# Patient Record
Sex: FEMALE | Race: OTHER | Employment: PART TIME | URBAN - METROPOLITAN AREA
[De-identification: names, ages, dates, MRNs, and addresses within clinical notes are randomized per-mention and may not be internally consistent; named-entity substitution may affect disease eponyms.]

---
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
## 2023-05-02 ENCOUNTER — OFFICE VISIT (OUTPATIENT)
Dept: INTERNAL MEDICINE | Age: 23
End: 2023-05-02
Payer: MEDICAID

## 2023-05-02 VITALS
BODY MASS INDEX: 36.14 KG/M2 | HEIGHT: 62 IN | SYSTOLIC BLOOD PRESSURE: 123 MMHG | WEIGHT: 196.4 LBS | TEMPERATURE: 97.5 F | DIASTOLIC BLOOD PRESSURE: 71 MMHG | OXYGEN SATURATION: 98 % | HEART RATE: 77 BPM

## 2023-05-02 DIAGNOSIS — Z11.59 NEED FOR HEPATITIS C SCREENING TEST: ICD-10-CM

## 2023-05-02 DIAGNOSIS — N64.52 NIPPLE DISCHARGE: Primary | ICD-10-CM

## 2023-05-02 DIAGNOSIS — Z11.3 ROUTINE SCREENING FOR STI (SEXUALLY TRANSMITTED INFECTION): ICD-10-CM

## 2023-05-02 PROBLEM — Z86.69 HX OF MIGRAINES: Status: RESOLVED | Noted: 2018-06-13 | Resolved: 2023-05-02

## 2023-05-02 PROBLEM — O09.90 HIGH-RISK PREGNANCY: Status: RESOLVED | Noted: 2018-06-13 | Resolved: 2023-05-02

## 2023-05-02 PROBLEM — Z3A.40 40 WEEKS GESTATION OF PREGNANCY: Status: RESOLVED | Noted: 2019-05-02 | Resolved: 2023-05-02

## 2023-05-02 PROBLEM — O09.893 SHORT INTERVAL BETWEEN PREGNANCIES COMPLICATING PREGNANCY, ANTEPARTUM, THIRD TRIMESTER: Status: RESOLVED | Noted: 2019-05-02 | Resolved: 2023-05-02

## 2023-05-02 PROCEDURE — 99203 OFFICE O/P NEW LOW 30 MIN: CPT | Performed by: STUDENT IN AN ORGANIZED HEALTH CARE EDUCATION/TRAINING PROGRAM

## 2023-05-02 SDOH — ECONOMIC STABILITY: FOOD INSECURITY: WITHIN THE PAST 12 MONTHS, YOU WORRIED THAT YOUR FOOD WOULD RUN OUT BEFORE YOU GOT MONEY TO BUY MORE.: NEVER TRUE

## 2023-05-02 SDOH — ECONOMIC STABILITY: INCOME INSECURITY: HOW HARD IS IT FOR YOU TO PAY FOR THE VERY BASICS LIKE FOOD, HOUSING, MEDICAL CARE, AND HEATING?: NOT HARD AT ALL

## 2023-05-02 SDOH — ECONOMIC STABILITY: HOUSING INSECURITY
IN THE LAST 12 MONTHS, WAS THERE A TIME WHEN YOU DID NOT HAVE A STEADY PLACE TO SLEEP OR SLEPT IN A SHELTER (INCLUDING NOW)?: NO

## 2023-05-02 SDOH — ECONOMIC STABILITY: FOOD INSECURITY: WITHIN THE PAST 12 MONTHS, THE FOOD YOU BOUGHT JUST DIDN'T LAST AND YOU DIDN'T HAVE MONEY TO GET MORE.: NEVER TRUE

## 2023-05-02 ASSESSMENT — PATIENT HEALTH QUESTIONNAIRE - PHQ9
SUM OF ALL RESPONSES TO PHQ QUESTIONS 1-9: 0
2. FEELING DOWN, DEPRESSED OR HOPELESS: 0
SUM OF ALL RESPONSES TO PHQ QUESTIONS 1-9: 0
SUM OF ALL RESPONSES TO PHQ9 QUESTIONS 1 & 2: 0
SUM OF ALL RESPONSES TO PHQ QUESTIONS 1-9: 0
1. LITTLE INTEREST OR PLEASURE IN DOING THINGS: 0
SUM OF ALL RESPONSES TO PHQ QUESTIONS 1-9: 0

## 2023-05-02 NOTE — PROGRESS NOTES
[facility]    New Patient Note/History and Physical    Date of patient's visit: 5/2/2023    Name: Jose Peck      YOB: 2000    Patient Care Team:  Jaydon Zabala MD as PCP - General (Internal Medicine)    REASON FOR VISIT: First Visit, establish care     Chief Complaint   Patient presents with    Nipple Problem     Pt having nipple discharge comes out green on rt breast and has white discharge on left    New Patient     To est care and have check up       HISTORY OF PRESENTING ILLNESS:    History was obtained from the patient. Jose Peck is a 21 y.o. is here to establish care. She has no substantial PMH and does not take any medications. Current concerns include   - Nipple discharge b/l but L>R. Left nipple discharge is milky, right nipple discharge has been green lately. No bloody discharge. States it is small amount and only occurs with expression/pressure. Last pregnancy was in 2019. She stopped breast feeding 2020 but felt she never fully \"dried up\". Amount/frequency has slowly lessened but still present. Is not on any medications. No appreciable masses of the breast, axillary lymphadenopathy, rash/skin changes of the breast. No fever, chills. Possible FHx of breast cancer in her paternal aunt, cousin but unsure exactly. Has h/o migraines but no recent worsening, no vision changes. In terms of HM, pt state she had Pap in her 25s with Ob/Gyn Dr. Tosha Guy, unsure when. States it was normal but records not available. Seldom sexually active. Has 1 partner. No hx of STI. Agreeable to C/G screening, although asymptomatic. Not on contraceptives. Was on Nexplanon but was removed years ago per patient. 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 No tobacco, drug or EtOH use. Otherwise reports feeling well.

## 2023-05-09 ENCOUNTER — TELEPHONE (OUTPATIENT)
Dept: INTERNAL MEDICINE | Age: 23
End: 2023-05-09

## 2023-05-09 DIAGNOSIS — N64.52 NIPPLE DISCHARGE: Primary | ICD-10-CM

## 2023-05-09 NOTE — TELEPHONE ENCOUNTER
[facility] since patient is having symptoms she also needs diagnostic mammogram.

## 2025-03-22 ENCOUNTER — HOSPITAL ENCOUNTER (EMERGENCY)
Age: 25
Discharge: HOME | End: 2025-03-22
Attending: EMERGENCY MEDICINE
Payer: MEDICAID

## 2025-03-22 VITALS
OXYGEN SATURATION: 99 % | TEMPERATURE: 98.6 F | SYSTOLIC BLOOD PRESSURE: 120 MMHG | DIASTOLIC BLOOD PRESSURE: 71 MMHG | HEART RATE: 78 BPM | RESPIRATION RATE: 19 BRPM

## 2025-03-22 DIAGNOSIS — Z32.01 PREGNANCY TEST POSITIVE: Primary | ICD-10-CM

## 2025-03-22 LAB — HCG SERPL QL: POSITIVE

## 2025-03-22 PROCEDURE — 99283 EMERGENCY DEPT VISIT LOW MDM: CPT

## 2025-03-22 PROCEDURE — 84703 CHORIONIC GONADOTROPIN ASSAY: CPT

## 2025-03-22 ASSESSMENT — ENCOUNTER SYMPTOMS
RESPIRATORY NEGATIVE: 1
GASTROINTESTINAL NEGATIVE: 1

## 2025-03-22 ASSESSMENT — PAIN - FUNCTIONAL ASSESSMENT: PAIN_FUNCTIONAL_ASSESSMENT: NONE - DENIES PAIN

## 2025-03-22 NOTE — ED NOTES
Pt presented to ED ambulatory from triage.  Pt presents with wanting a pregnancy test.  Pt states she has no other C/O.  Pt denies any pain.  Pt is Alert and oriented. Pt is resting comfortably on stretcher with call light in reach.  No acute distress noted. Respirations are even and unlabored.  White board updated. Will continue to follow plan of care.

## 2025-03-22 NOTE — ED PROVIDER NOTES
[facility]     Emergency Department     Faculty Note/ Attestation      Pt Name: Valerie Brink                                       MRN: [id number]  Birthdate 2000  Date of evaluation: 3/22/2025  Note Started: 6:52 PM EDT    Patients PCP:    Esther Ruano MD    Attestation  I performed a history and physical examination of the patient and discussed management with the resident. I reviewed the resident’s note and agree with the documented findings and plan of care. Any areas of disagreement are noted on the chart. I was personally present for the key portions of any procedures. I have documented in the chart those procedures where I was not present during the key portions. I have reviewed the emergency nurses triage note. I agree with the chief complaint, past medical history, past surgical history, allergies, medications, social and family history as documented unless otherwise noted below.    For Physician Assistant/ Nurse Practitioner cases/documentation I have personally evaluated this patient and have completed at least one if not all key elements of the E/M (history, physical exam, and MDM). Additional findings are as noted.    Initial Screens:        Dav Coma Scale  Eye Opening: Spontaneous  Best Verbal Response: Oriented  Best Motor Response: Obeys commands  [place] Coma Scale Score: 15    Vitals:    Vitals:    03/22/25 1853   BP: 120/71   Pulse: 78   Resp: 19   Temp: 98.6 °F (37 °C)   TempSrc: Oral   SpO2: 99%       CHIEF COMPLAINT       Chief Complaint   Patient presents with   • Pregnancy Test     Patient is a 24-year-old female here for pregnancy test no other symptoms no nausea vomiting vaginal bleeding or discharge      EMERGENCY DEPARTMENT COURSE:     -------------------------  BP: 120/71, Temp: 98.6 °F (37 °C), Pulse: 78, Respirations: 19  Physical Exam __  Constitutional:  oriented to person, place, and time.  appears well-developed and well-nourished.   HENT: no  facial swelling or edema  Head: Normocephalic and atraumatic.   Eyes: Right eye exhibits no discharge. Left eye exhibits no discharge. No scleral icterus.   Neck: No JVD present. No tracheal deviation present.   Cardiovascular: pulses present in extremities  Pulmonary/Chest: Effort normal. No respiratory distress.   Musculoskeletal: Normal range of motion. exhibits no edema.   Neurological: they are alert and oriented to person, place, and time.  Skin: Skin is warm and dry.   Psychiatric: has a normal mood and affect.  behavior is normal.      Comments  Medical Decision Making  Amount and/or Complexity of Data Reviewed  Labs: ordered.      Will assess pregnancy test without symptoms patient to follow-up as an outpatient with OB for the amenorrhea based on the results of the pregnancy test    ED Course as of 03/22/25 1956   Sat Mar 22, 2025   1938 Preg, Serum(!): POSITIVE [[place]]   1943 Patient was updated regarding left finding.  Return precaution were given and explained including worsening of symptoms or developing new symptoms like vaginal bleeding, discharge, pain, abdominal pain, nausea, vomiting, fever.  Patient was advised to follow-up with an OB/GYN doctor for a repeat beta hCG along with a transvaginal ultrasound to confirm the location of pregnancy.  A list has been provided to the patient for the OB clinic.  Patient vocalized understanding and agrees with the plan.  Patient was advised to take prenatal vitamins as well in addition to following up with primary care physician.  Patient was given opportunity to ask question all question answered accordingly.  Stable for discharge [[place]]      ED Course User Index  [[place]] Louann Mcfadden MD William Krebs, DO, RDMS.  Attending Emergency Physician         Rivas Gonzalez DO  03/22/25 1956

## 2025-03-22 NOTE — ED PROVIDER NOTES
[facility] [place]  Emergency Department Encounter  Emergency Medicine Resident     Pt Name:Valerie Brink  MRN: [id number]  Birthdate 2000  Date of evaluation: 3/22/25  PCP:  Esther Ruano MD  Note Started: 6:53 PM EDT      CHIEF COMPLAINT       Chief Complaint   Patient presents with    Pregnancy Test       HISTORY OF PRESENT ILLNESS  (Location/Symptom, Timing/Onset, Context/Setting, Quality, Duration, Modifying Factors, Severity.)      Valerie Brink is a 24 y.o. female who presents with a positive pregnancy test at home.  Patient stated that her last menstrual period was on Feb 9 and she is late.  Patient states that she took to home pregnancy test, was positive and was negative.  She denies any abdominal pain, nausea, vomiting, vaginal discharge, dysuria, urgency, frequency, fever, chills.  Denies any chest pain, shortness of breath or any other medical complain    PAST MEDICAL / SURGICAL / SOCIAL / FAMILY HISTORY      has no past medical history on file.       has no past surgical history on file.      Social History     Socioeconomic History    Marital status: Single     Spouse name: Not on file    Number of children: Not on file    Years of education: Not on file    Highest education level: Not on file   Occupational History    Not on file   Tobacco Use    Smoking status: Never    Smokeless tobacco: Never   Vaping Use    Vaping status: Never Used   Substance and Sexual Activity    Alcohol use: Not Currently    Drug use: Never    Sexual activity: Yes     Partners: Male   Other Topics Concern    Not on file   Social History Narrative    Not on file     Social Drivers of Health     Financial Resource Strain: Low Risk  (5/2/2023)    Overall Financial Resource Strain (CARDIA)     Difficulty of Paying Living Expenses: Not hard at all   Food Insecurity: Not on file (5/2/2023)   Transportation Needs: Unknown (5/2/2023)    PRAPARE - Transportation     Lack of Transportation (Medical):  patient's condition requiring my direct management. Critical care time  minutes, excluding any documented procedures.    FINAL IMPRESSION      1. Pregnancy test positive          DISPOSITION / PLAN     DISPOSITION Decision To Discharge 03/22/2025 07:38:17 PM   DISPOSITION CONDITION Stable           PATIENT REFERRED TO:  Esther Ruano MD  [facility] [id number] [phone]    Call   Follow-up    [facility]  [address] [place] [address] [id number] [phone]  In 1 day  To establish care      DISCHARGE MEDICATIONS:  New Prescriptions    No medications on file       Louann Mcfadden MD  Emergency Medicine Resident    (Please note that portions of this note were completed with a voice recognition program.  Efforts were made to edit the dictations but occasionally words are mis-transcribed.)

## 2025-03-22 NOTE — DISCHARGE INSTRUCTIONS
You came in after having a positive and negative pregnancy test at home.  We did a blood pregnancy test which was positive.  We think you are pregnant and recommend that you follow-up with an OB doctor for a repeat of hCG test along with an ultrasound for confirmation and location.    A list of OB has been provided to you kindly call to set up an appointment.  We also recommend that you start your prenatal vitamins    If you have worsening of symptoms or develop any new symptoms like vaginal discharge, abdominal pain, nausea, vomiting, dizziness, fever, dysuria, urinary urgency or frequency please come back to the ED or follow-up with a PCP.